# Patient Record
Sex: MALE | Race: WHITE | NOT HISPANIC OR LATINO | URBAN - METROPOLITAN AREA
[De-identification: names, ages, dates, MRNs, and addresses within clinical notes are randomized per-mention and may not be internally consistent; named-entity substitution may affect disease eponyms.]

---

## 2024-03-20 NOTE — PROGRESS NOTES
"Subjective   Aric Posey \"cleopatra\" is a 53 y.o. male who presents  as a NPV to ESTABLISH PCP CARE for CARE GAP REVIEW with COMPLAINTS OF NECK and SHOULDER PAIN.     HPI:      54 yo male EX-SMOKER (1 ppd x 10 years; quit 20 years ago) presents  as a NPV to ESTABLISH PCP CARE for CARE GAP REVIEW with COMPLAINTS OF NECK and SHOULDER PAIN.      EMR/EPIC records reviewed. LIMITED RECORDS AVAILABLE FOR REVIEW.    PMHx:  -anxiety; on celexa and buspar  -GERD  -Bilateral carpal tunnel and paraesthesias  -Left ulnar neuropathy: underwent EMG 10/16/2020 with Rosa Morales DO    Healthcare Providers:  GI: none  Neurology: Rosa Morales DO; last seen for EMG 10/26/2020    Preventive Health Services:  -Last physical:  -last PSA: ?  -last colonoscopy or cologuard: NEVER==> DUE NOW  -last STI screening: ?  -Hep C screening: ?      Immunizations:   -Childhood vaccines: completed per patient    -COVID vaccine and booster:  -updated COVID spike vaccine: NOW DUE  -Flu vaccine: NOW DUE==> DECLINED  -TDAP:  NOW DUE  -shingles: NOW DUE    There is no immunization history on file for this patient.      Today Aric  reports:    - ongoing neck and shoulder pain x 1.5 years, rated in severity as 8/10, not worsening over time.  Pain exacerbated by activity and lifting arms over shoulders , somewhat relieved with rest and tylenol. He denies fevers/chills, N/V, CP, heart palpitations, headache,  dizziness, weakness, numbness/tingling, saddle anesthesia, or difficulty walking, or  history of any known neck or shoulder trauma.  Expressed interest in referral to orthopedic surgery and xrays.     -ongoing bilateral knee pain x 2 years, rated rated in severity 4/10 , worsening over time.   He denies fevers/chills, weakness, knee swelling, numbness/tingling, difficulty walking, or  history of any known knee trauma, or family history of rheumatologic disease.  Expressed interest in referral to orthopedic surgery and xrays.     -otherwise doing " and feeling well.     -taking all medications as prescribed with no reported adverse medication side effects      Today he has no other reported complaints, issues, or problems.    ROS is NEG for HEADACHE, NAUSEA, VOMITING, DIARRHEA, CHEST PAIN, SOB, and BLEEDING.    Review of systems (10+) is negative for all systems except for any identified issues in HPI above.      Sexually active with  Denies history STIs      SHx:  -employment: work at factory, up and down ladders, building furnaces   -tobacco use: EX-SMOKER (1 ppd x 10 years; quit 20 years ago)   -alcohol use:  -illicits:       FHx:  Cancer:  HTN:  DM:  Heart Disease:  -a fib: father  Stroke:  Thyroid Disease:        Objective     /78   Pulse 91   Temp 36.1 °C (97 °F)   Wt 113 kg (250 lb)   SpO2 98%   BMI 32.10 kg/m²      Physical Examination:       GENERAL           General Appearance: well-appearing, well-developed, well-hydrated, well-nourished, no acute distress.        HEENT           NECK supple, no masses or thyromegaly, no carotid bruit.        EYES           Extraocular Movements: normal, bilateral eyes ANGELINA, no conjunctival injection.        HEART           Rate and Rhythm regular rate and rhythm. Heart sounds: normal S1S2, no S3 or S4. Murmurs: none.        CHEST           Breath sounds: Clear to IPPA, RR<16 no use of accessory muscles.        ABDOMEN           General: Neg for LKKS or masses, no scleral icterus or jaundice.        MUSCULOSKELETAL           Joints Demonstration: Neg for erythema, swelling or joint deformities. gross abnormalities no gross abnormalities. SPINE (cervical==> coccyx): non-tender to palpation. FULL ROM.  Cervical Paraspinal muscles: tender to palpation. RIGHT and LEFT Shoulders bilaterally: AC joint mildly tender to palpation, no effusions, no erythema, no winging of scapula, negative Neer and Hare test. mildly decreased ROM, when abducting arm posteriorly. Right and left hands/UR neurovascularly intact.  RIGHT and LEFT KNEES: non-tender to palpation. No effusions, no joint line tenderness, no erythema, no callor. Full ROM. Negative anterior drawer and Lachman's.       EXTREMITIES           Lower Extremities: Neg for cyanosis, clubbing or edema.       Assessment/Plan   Problem List Items Addressed This Visit       Arthralgia    Relevant Orders    Uric acid    Sedimentation Rate    C-reactive protein    Rheumatoid factor    MIRTA     Other Visit Diagnoses       Encounter to establish care    -  Primary    Relevant Orders    CBC and Auto Differential    Comprehensive metabolic panel    Urinalysis with Reflex Microscopic    Tsh With Reflex To Free T4 If Abnormal    Lipid screening        Relevant Orders    Lipid panel    Diabetes mellitus screening        Relevant Orders    Hemoglobin A1c    Vitamin D deficiency        Relevant Orders    Vitamin D 25-Hydroxy,Total (for eval of Vitamin D levels)    Encounter for hepatitis C screening test for low risk patient        Relevant Orders    Hepatitis C antibody    Routine screening for STI (sexually transmitted infection)        Relevant Orders    HIV 1/2 Antigen/Antibody Screen with Reflex to Confirmation    Syphilis Screen with Reflex    C. trachomatis / N. gonorrhoeae, DNA probe    Trichomonas vaginalis, Amplified    Colon cancer screening        Relevant Orders    Colonoscopy Screening; Average Risk Patient    Cologuard® colon cancer screening    Prostate cancer screening        Relevant Orders    Prostate Spec.Ag,Screen    Neck pain        Relevant Orders    XR cervical spine complete 4-5 views    Referral to Physical Therapy    XR cervical spine complete 4-5 views    Shoulder pain, unspecified chronicity, unspecified laterality        Relevant Orders    Referral to Physical Therapy    Encounter for screening for coronary artery disease        Relevant Orders    CT cardiac scoring wo IV contrast    Immunization counseling        Chronic left shoulder pain        Relevant  Orders    XR shoulder left 2+ views    Chronic right shoulder pain        Relevant Orders    XR shoulder right 2+ views    Chronic pain of left knee        Relevant Orders    XR knee left 4+ views    Chronic pain of right knee        Relevant Orders    XR knee right 4+ views          Establish PCP care  -labs ordered (see A/P above for details)    Cervical neck pain: on exam+ TPP of paraspinal cervical muscles most consistent with cervical muscle strain and muscle spasms. No red flag signs/sxs.  -neck XR ordered  -PT referral ordered  -heating pad applied to neck 4 times a day  -ibuprofen 600 mg every 8 hours  with food x 5 days  -emergency Dept precautions discussed and reviewed with patient    Bilateral Shoulder  and knee pain: r/o OA, soft tissue structural issues, and rheumatologic disorder  -uric acid, RF, ESR, CRP, MIRTA ordered  -XR bilateral knees and shoulders ordered  -PT referral ordered  -orthopedic surgery referral ordered  -ibuprofen 600 mg every 8 hours with food  x 5 days      Elevated BP:  -low salt diet, regularly exercise, and limit alcohol intake  -if remains elevated at follow up visit will discuss starting BP medications      Lipid Disorder screening  -lipid panel ordered    Diabetes Screening  -HgBA1c ordered    Vitamin D deficiency  -Vit D levels ordered     Hep C screening  -Hep C antibody ordered     STI Screening:  -HIV, syphilis, GC/CT, trich ordered    Colon Cancer Screening: NOW DUE   -colonoscopy ordered  -cologuard ordered if patient decides he prefers cologuard testing over colonoscopy    Counseling:       Medication education:         Education:  The patient is counseled regarding potential side-effects of all new medications        Understanding:  Patient expressed understanding        Adherence:  No barriers to adherence identified        Immunizations Counseling  -flu vaccine, shingles, and TDAP now due==> declined today  -recommend updated COVID spike vaccine that can be obtained  at local pharmacy     FOLLOW-UP: 4 weeks to discuss and review test results and 8 weeks for PHYSICAL     Discussed recommended plan of care with patient. Patient expressed understanding and agreement with plan of care. All of patient's questions were answered at the time. Patient had no additional questions at the time.         Shea Yeung MD, PhD

## 2024-03-20 NOTE — PATIENT INSTRUCTIONS
It was nice seeing you today.    Please go to [facility] lab or another [facility] lab facility to complete the lab testing that I ordered . You can walk into the [facility] radiology to dept to complete neck, knee, and shoulder Xrays     Please call radiology to schedule   CT Heart Calcium scoring    Please call referral line to schedule: 1) colonoscopy; and 2) physical therapy    If  you prefer cologuard for colon cancer screening instead of a colonoscopy, I have ordered cologuard tat will be shipped your home. When you receive please follow the directions and send back to the company for processing.     I recommend receiving the TDAP booster that prevents tetanus and other infectious disease, shingles vaccine, and  the flu vaccine    I recommend the updated COVID vaccine that can be obtained at your local pharmacy    Please schedule a follow up with me in 4  weeks to discuss and review your test results and 8 weeks for a physical

## 2024-03-21 ENCOUNTER — HOSPITAL ENCOUNTER (OUTPATIENT)
Dept: RADIOLOGY | Facility: CLINIC | Age: 54
Discharge: HOME | End: 2024-03-21
Payer: COMMERCIAL

## 2024-03-21 ENCOUNTER — OFFICE VISIT (OUTPATIENT)
Dept: PRIMARY CARE | Facility: CLINIC | Age: 54
End: 2024-03-21
Payer: COMMERCIAL

## 2024-03-21 VITALS
HEART RATE: 91 BPM | TEMPERATURE: 97 F | DIASTOLIC BLOOD PRESSURE: 78 MMHG | WEIGHT: 250 LBS | OXYGEN SATURATION: 98 % | BODY MASS INDEX: 32.1 KG/M2 | SYSTOLIC BLOOD PRESSURE: 138 MMHG

## 2024-03-21 DIAGNOSIS — M54.2 NECK PAIN: ICD-10-CM

## 2024-03-21 DIAGNOSIS — Z13.1 DIABETES MELLITUS SCREENING: ICD-10-CM

## 2024-03-21 DIAGNOSIS — G89.29 CHRONIC RIGHT SHOULDER PAIN: ICD-10-CM

## 2024-03-21 DIAGNOSIS — M25.519 SHOULDER PAIN, UNSPECIFIED CHRONICITY, UNSPECIFIED LATERALITY: ICD-10-CM

## 2024-03-21 DIAGNOSIS — Z71.85 IMMUNIZATION COUNSELING: ICD-10-CM

## 2024-03-21 DIAGNOSIS — M25.511 CHRONIC RIGHT SHOULDER PAIN: ICD-10-CM

## 2024-03-21 DIAGNOSIS — G89.29 CHRONIC PAIN OF RIGHT KNEE: ICD-10-CM

## 2024-03-21 DIAGNOSIS — Z13.220 LIPID SCREENING: ICD-10-CM

## 2024-03-21 DIAGNOSIS — Z13.6 ENCOUNTER FOR SCREENING FOR CORONARY ARTERY DISEASE: ICD-10-CM

## 2024-03-21 DIAGNOSIS — M25.512 CHRONIC LEFT SHOULDER PAIN: ICD-10-CM

## 2024-03-21 DIAGNOSIS — M25.561 CHRONIC PAIN OF RIGHT KNEE: ICD-10-CM

## 2024-03-21 DIAGNOSIS — E55.9 VITAMIN D DEFICIENCY: ICD-10-CM

## 2024-03-21 DIAGNOSIS — G89.29 CHRONIC PAIN OF LEFT KNEE: ICD-10-CM

## 2024-03-21 DIAGNOSIS — M25.50 ARTHRALGIA, UNSPECIFIED JOINT: ICD-10-CM

## 2024-03-21 DIAGNOSIS — Z12.5 PROSTATE CANCER SCREENING: ICD-10-CM

## 2024-03-21 DIAGNOSIS — Z76.89 ENCOUNTER TO ESTABLISH CARE: Primary | ICD-10-CM

## 2024-03-21 DIAGNOSIS — M25.562 CHRONIC PAIN OF LEFT KNEE: ICD-10-CM

## 2024-03-21 DIAGNOSIS — Z12.11 COLON CANCER SCREENING: ICD-10-CM

## 2024-03-21 DIAGNOSIS — G89.29 CHRONIC LEFT SHOULDER PAIN: ICD-10-CM

## 2024-03-21 DIAGNOSIS — Z11.3 ROUTINE SCREENING FOR STI (SEXUALLY TRANSMITTED INFECTION): ICD-10-CM

## 2024-03-21 DIAGNOSIS — Z11.59 ENCOUNTER FOR HEPATITIS C SCREENING TEST FOR LOW RISK PATIENT: ICD-10-CM

## 2024-03-21 DIAGNOSIS — R03.0 ELEVATED BLOOD PRESSURE READING: ICD-10-CM

## 2024-03-21 PROBLEM — K21.9 LARYNGOPHARYNGEAL REFLUX: Status: ACTIVE | Noted: 2024-03-21

## 2024-03-21 PROBLEM — R09.A2 GLOBUS SENSATION: Status: ACTIVE | Noted: 2024-03-21

## 2024-03-21 PROBLEM — F41.9 CHRONIC ANXIETY: Status: ACTIVE | Noted: 2018-10-31

## 2024-03-21 PROCEDURE — 1036F TOBACCO NON-USER: CPT | Performed by: FAMILY MEDICINE

## 2024-03-21 PROCEDURE — 72050 X-RAY EXAM NECK SPINE 4/5VWS: CPT | Performed by: RADIOLOGY

## 2024-03-21 PROCEDURE — 73564 X-RAY EXAM KNEE 4 OR MORE: CPT | Mod: RT

## 2024-03-21 PROCEDURE — 73564 X-RAY EXAM KNEE 4 OR MORE: CPT | Mod: LEFT SIDE | Performed by: RADIOLOGY

## 2024-03-21 PROCEDURE — 73030 X-RAY EXAM OF SHOULDER: CPT | Mod: LT

## 2024-03-21 PROCEDURE — 73564 X-RAY EXAM KNEE 4 OR MORE: CPT | Mod: LT

## 2024-03-21 PROCEDURE — 73030 X-RAY EXAM OF SHOULDER: CPT | Mod: RT

## 2024-03-21 PROCEDURE — 73030 X-RAY EXAM OF SHOULDER: CPT | Mod: RIGHT SIDE | Performed by: RADIOLOGY

## 2024-03-21 PROCEDURE — 72050 X-RAY EXAM NECK SPINE 4/5VWS: CPT

## 2024-03-21 PROCEDURE — 99204 OFFICE O/P NEW MOD 45 MIN: CPT | Performed by: FAMILY MEDICINE

## 2024-03-21 RX ORDER — TRAZODONE HYDROCHLORIDE 100 MG/1
100 TABLET ORAL NIGHTLY
COMMUNITY

## 2024-03-21 RX ORDER — BUSPIRONE HYDROCHLORIDE 10 MG/1
10 TABLET ORAL 2 TIMES DAILY
COMMUNITY

## 2024-03-21 RX ORDER — CITALOPRAM 40 MG/1
40 TABLET, FILM COATED ORAL DAILY
COMMUNITY

## 2024-03-21 RX ORDER — PANTOPRAZOLE SODIUM 40 MG/1
40 TABLET, DELAYED RELEASE ORAL DAILY
COMMUNITY

## 2024-03-21 ASSESSMENT — PATIENT HEALTH QUESTIONNAIRE - PHQ9
2. FEELING DOWN, DEPRESSED OR HOPELESS: NOT AT ALL
SUM OF ALL RESPONSES TO PHQ9 QUESTIONS 1 AND 2: 0
1. LITTLE INTEREST OR PLEASURE IN DOING THINGS: NOT AT ALL

## 2024-03-21 ASSESSMENT — PAIN SCALES - GENERAL: PAINLEVEL: 5

## 2024-03-22 ENCOUNTER — LAB (OUTPATIENT)
Dept: LAB | Facility: LAB | Age: 54
End: 2024-03-22
Payer: COMMERCIAL

## 2024-03-22 DIAGNOSIS — Z76.89 ENCOUNTER TO ESTABLISH CARE: ICD-10-CM

## 2024-03-22 DIAGNOSIS — Z11.59 ENCOUNTER FOR HEPATITIS C SCREENING TEST FOR LOW RISK PATIENT: ICD-10-CM

## 2024-03-22 DIAGNOSIS — Z11.3 ROUTINE SCREENING FOR STI (SEXUALLY TRANSMITTED INFECTION): ICD-10-CM

## 2024-03-22 DIAGNOSIS — M25.50 ARTHRALGIA, UNSPECIFIED JOINT: ICD-10-CM

## 2024-03-22 DIAGNOSIS — Z13.1 DIABETES MELLITUS SCREENING: ICD-10-CM

## 2024-03-22 DIAGNOSIS — Z12.5 PROSTATE CANCER SCREENING: ICD-10-CM

## 2024-03-22 DIAGNOSIS — E55.9 VITAMIN D DEFICIENCY: ICD-10-CM

## 2024-03-22 DIAGNOSIS — Z13.220 LIPID SCREENING: ICD-10-CM

## 2024-03-22 LAB
25(OH)D3 SERPL-MCNC: 28 NG/ML (ref 31–100)
ALBUMIN SERPL-MCNC: 4.8 G/DL (ref 3.5–5)
ALP BLD-CCNC: 89 U/L (ref 35–125)
ALT SERPL-CCNC: 35 U/L (ref 5–40)
ANION GAP SERPL CALC-SCNC: 10 MMOL/L
AST SERPL-CCNC: 27 U/L (ref 5–40)
BASOPHILS # BLD AUTO: 0.02 X10*3/UL (ref 0–0.1)
BASOPHILS NFR BLD AUTO: 0.4 %
BILIRUB SERPL-MCNC: 0.5 MG/DL (ref 0.1–1.2)
BUN SERPL-MCNC: 17 MG/DL (ref 8–25)
CALCIUM SERPL-MCNC: 9.8 MG/DL (ref 8.5–10.4)
CHLORIDE SERPL-SCNC: 100 MMOL/L (ref 97–107)
CHOLEST SERPL-MCNC: 239 MG/DL (ref 133–200)
CHOLEST/HDLC SERPL: 6.3 {RATIO}
CO2 SERPL-SCNC: 27 MMOL/L (ref 24–31)
CREAT SERPL-MCNC: 1.1 MG/DL (ref 0.4–1.6)
CRP SERPL-MCNC: <0.3 MG/DL (ref 0–2)
EGFRCR SERPLBLD CKD-EPI 2021: 79 ML/MIN/1.73M*2
EOSINOPHIL # BLD AUTO: 0.08 X10*3/UL (ref 0–0.7)
EOSINOPHIL NFR BLD AUTO: 1.6 %
ERYTHROCYTE [DISTWIDTH] IN BLOOD BY AUTOMATED COUNT: 12.1 % (ref 11.5–14.5)
ERYTHROCYTE [SEDIMENTATION RATE] IN BLOOD BY WESTERGREN METHOD: 7 MM/H (ref 0–20)
EST. AVERAGE GLUCOSE BLD GHB EST-MCNC: 117 MG/DL
GLUCOSE SERPL-MCNC: 102 MG/DL (ref 65–99)
HBA1C MFR BLD: 5.7 %
HCT VFR BLD AUTO: 43.6 % (ref 41–52)
HCV AB SER QL: NONREACTIVE
HDLC SERPL-MCNC: 38 MG/DL
HGB BLD-MCNC: 14.9 G/DL (ref 13.5–17.5)
HIV 1+2 AB+HIV1 P24 AG SERPL QL IA: NONREACTIVE
IMM GRANULOCYTES # BLD AUTO: 0.01 X10*3/UL (ref 0–0.7)
IMM GRANULOCYTES NFR BLD AUTO: 0.2 % (ref 0–0.9)
LDLC SERPL CALC-MCNC: 157 MG/DL (ref 65–130)
LYMPHOCYTES # BLD AUTO: 1.47 X10*3/UL (ref 1.2–4.8)
LYMPHOCYTES NFR BLD AUTO: 29.5 %
MCH RBC QN AUTO: 31.8 PG (ref 26–34)
MCHC RBC AUTO-ENTMCNC: 34.2 G/DL (ref 32–36)
MCV RBC AUTO: 93 FL (ref 80–100)
MONOCYTES # BLD AUTO: 0.47 X10*3/UL (ref 0.1–1)
MONOCYTES NFR BLD AUTO: 9.4 %
NEUTROPHILS # BLD AUTO: 2.93 X10*3/UL (ref 1.2–7.7)
NEUTROPHILS NFR BLD AUTO: 58.9 %
NRBC BLD-RTO: 0 /100 WBCS (ref 0–0)
PLATELET # BLD AUTO: 237 X10*3/UL (ref 150–450)
POTASSIUM SERPL-SCNC: 4.6 MMOL/L (ref 3.4–5.1)
PROT SERPL-MCNC: 7.5 G/DL (ref 5.9–7.9)
PSA SERPL-MCNC: 0.5 NG/ML
RBC # BLD AUTO: 4.69 X10*6/UL (ref 4.5–5.9)
RHEUMATOID FACT SER NEPH-ACNC: <10 IU/ML (ref 0–15)
SODIUM SERPL-SCNC: 137 MMOL/L (ref 133–145)
TREPONEMA PALLIDUM IGG+IGM AB [PRESENCE] IN SERUM OR PLASMA BY IMMUNOASSAY: NONREACTIVE
TRIGL SERPL-MCNC: 219 MG/DL (ref 40–150)
TSH SERPL DL<=0.05 MIU/L-ACNC: 1.24 MIU/L (ref 0.27–4.2)
URATE SERPL-MCNC: 6.9 MG/DL (ref 3.6–7.7)
WBC # BLD AUTO: 5 X10*3/UL (ref 4.4–11.3)

## 2024-03-22 PROCEDURE — 80053 COMPREHEN METABOLIC PANEL: CPT

## 2024-03-22 PROCEDURE — 84550 ASSAY OF BLOOD/URIC ACID: CPT

## 2024-03-22 PROCEDURE — 86038 ANTINUCLEAR ANTIBODIES: CPT

## 2024-03-22 PROCEDURE — 84443 ASSAY THYROID STIM HORMONE: CPT

## 2024-03-22 PROCEDURE — 36415 COLL VENOUS BLD VENIPUNCTURE: CPT

## 2024-03-22 PROCEDURE — 87389 HIV-1 AG W/HIV-1&-2 AB AG IA: CPT

## 2024-03-22 PROCEDURE — 86803 HEPATITIS C AB TEST: CPT

## 2024-03-22 PROCEDURE — 84153 ASSAY OF PSA TOTAL: CPT

## 2024-03-22 PROCEDURE — 82306 VITAMIN D 25 HYDROXY: CPT

## 2024-03-22 PROCEDURE — 86431 RHEUMATOID FACTOR QUANT: CPT

## 2024-03-22 PROCEDURE — 80061 LIPID PANEL: CPT

## 2024-03-22 PROCEDURE — 86780 TREPONEMA PALLIDUM: CPT

## 2024-03-22 PROCEDURE — 83036 HEMOGLOBIN GLYCOSYLATED A1C: CPT

## 2024-03-22 PROCEDURE — 86140 C-REACTIVE PROTEIN: CPT

## 2024-03-22 PROCEDURE — 85652 RBC SED RATE AUTOMATED: CPT

## 2024-03-22 PROCEDURE — 85025 COMPLETE CBC W/AUTO DIFF WBC: CPT

## 2024-03-25 LAB — ANA SER QL HEP2 SUBST: NEGATIVE

## 2024-04-19 ENCOUNTER — OFFICE VISIT (OUTPATIENT)
Dept: ORTHOPEDIC SURGERY | Facility: CLINIC | Age: 54
End: 2024-04-19
Payer: COMMERCIAL

## 2024-04-19 VITALS — HEIGHT: 74 IN | WEIGHT: 250 LBS | BODY MASS INDEX: 32.08 KG/M2

## 2024-04-19 DIAGNOSIS — M54.12 CERVICAL RADICULOPATHY: ICD-10-CM

## 2024-04-19 DIAGNOSIS — M54.2 NECK PAIN: ICD-10-CM

## 2024-04-19 PROCEDURE — 99203 OFFICE O/P NEW LOW 30 MIN: CPT | Performed by: ORTHOPAEDIC SURGERY

## 2024-04-19 ASSESSMENT — PAIN - FUNCTIONAL ASSESSMENT: PAIN_FUNCTIONAL_ASSESSMENT: 0-10

## 2024-04-19 NOTE — LETTER
April 19, 2024     Shea Yeung MD PhD  [address] [facility] [id number]    Patient: Lyle Posey   YOB: 1970   Date of Visit: 4/19/2024       Dear Dr. Shea Yeung MD PhD:    Thank you for referring Lyle Posey to me for evaluation. Below are my notes for this consultation.  If you have questions, please do not hesitate to call me. I look forward to following your patient along with you.       Sincerely,     Ever Grant MD      CC: No Recipients  ______________________________________________________________________________________    HPI:Aric Posey is a 53-year-old man who comes in today with complaints of neck pain and intermittent tingling and numbness to his upper extremities.  He notices the tingling in the arms particular at nighttime when he is lying down.  He has had no recent physical therapy.  He denies any specific myelopathic symptoms other than some occasional problems with balance.      ROS:  Reviewed on EMR and patient intake sheet.    PMH/SH:   Reviewed on EMR and patient intake sheet.    Exam:  Physical Exam    Constitutional: Well appearing; no acute distress  Eyes: pupils are equal and round  Psych: normal affect  Respiratory: non-labored breathing  Cardiovascular: regular rate and rhythm  GI: non-distended abdomen  Musculoskeletal: no pain with range of motion of the shoulders bilaterally; no signs of impingement  Neurologic: [4+]/5 strength in the upper extremities bilaterally]; [negative] Robles's; [no hyper-reflexia]    Radiology:  X-rays demonstrate moderate disc degeneration at C5-6 and C6-C7.  MRI 4 years ago did demonstrate some moderate canal narrowing and neuroforaminal narrowing at the same levels.    Diagnosis:  Cervical radiculopathy    Assessment and Plan:   53-year-old male with symptomatic cervical radiculopathy.  At this time we will begin with physical therapy.  However if his symptoms do not improve, then an MRI and follow-up would be  appropriate.    The patient was in agreement with the plan. At the end of the visit today, the patient felt that all questions had been answered satisfactorily.  The patient was pleased with the visit and very appreciative for the care rendered.     Thank you very much for the kind referral.  It is a privilege, and a pleasure, to partner with you in the care of your patients.  I would be delighted to assist you with any further consultations as needed.      Ever Grant MD    Chief of Spine Surgery, [facility]  Director of Spine Service, [facility]  , Department of Orthopaedics  [facility] [employer]  [address] [id number]  P: [phone]  [facility]    This note was dictated with voice recognition software.  It has not been proofread for grammatical errors, typographical mistakes or other semantic inconsistencies.

## 2024-04-19 NOTE — PROGRESS NOTES
HPI:Aric Posey is a 53-year-old man who comes in today with complaints of neck pain and intermittent tingling and numbness to his upper extremities.  He notices the tingling in the arms particular at nighttime when he is lying down.  He has had no recent physical therapy.  He denies any specific myelopathic symptoms other than some occasional problems with balance.      ROS:  Reviewed on EMR and patient intake sheet.    PMH/SH:   Reviewed on EMR and patient intake sheet.    Exam:  Physical Exam    Constitutional: Well appearing; no acute distress  Eyes: pupils are equal and round  Psych: normal affect  Respiratory: non-labored breathing  Cardiovascular: regular rate and rhythm  GI: non-distended abdomen  Musculoskeletal: no pain with range of motion of the shoulders bilaterally; no signs of impingement  Neurologic: [4+]/5 strength in the upper extremities bilaterally]; [negative] Robles's; [no hyper-reflexia]    Radiology:  X-rays demonstrate moderate disc degeneration at C5-6 and C6-C7.  MRI 4 years ago did demonstrate some moderate canal narrowing and neuroforaminal narrowing at the same levels.    Diagnosis:  Cervical radiculopathy    Assessment and Plan:   53-year-old male with symptomatic cervical radiculopathy.  At this time we will begin with physical therapy.  However if his symptoms do not improve, then an MRI and follow-up would be appropriate.    The patient was in agreement with the plan. At the end of the visit today, the patient felt that all questions had been answered satisfactorily.  The patient was pleased with the visit and very appreciative for the care rendered.     Thank you very much for the kind referral.  It is a privilege, and a pleasure, to partner with you in the care of your patients.  I would be delighted to assist you with any further consultations as needed.      Ever Grant MD    Chief of Spine Surgery, [facility]  Director of Spine  Service, [facility]  , Department of Orthopaedics  [facility] [employer]  [address] [place] [address]  P: [phone]  [facility]Ohio[facility] [email or web address] [facility]    This note was dictated with voice recognition software.  It has not been proofread for grammatical errors, typographical mistakes or other semantic inconsistencies.

## 2024-04-27 NOTE — PROGRESS NOTES
"Momo Posey \"Lyle\" is a 53 y.o. male who presents for FOLLOW UP VISIT TO DISCUSS and REVIEW TEST RESULTS, and FOLLOW UP FOR COMPLAINTS OF NECK and SHOULDER PAIN.     HPI:      52 yo male EX-SMOKER (1 ppd x 10 years; quit 20 years ago) presents FOLLOW UP VISIT TO DISCUSS and REVIEW TEST RESULTS, and FOLLOW UP FOR COMPLAINTS OF NECK and SHOULDER PAIN.     EMR/EPIC records reviewed.    Last seen by me on 3/21/24 as a NPV to ESTABLISH PCP CARE for CARE GAP REVIEW with COMPLAINTS OF NECK and SHOULDER PAIN. At visit:    Establish PCP care  -labs ordered      Cervical neck pain: on exam+ TPP of paraspinal cervical muscles most consistent with cervical muscle strain and muscle spasms. No red flag signs/sxs.  -neck XR ordered  -PT referral ordered  -heating pad applied to neck 4 times a day  -ibuprofen 600 mg every 8 hours  with food x 5 days  -emergency Dept precautions discussed and reviewed with patient     Bilateral Shoulder  and knee pain: r/o OA, soft tissue structural issues, and rheumatologic disorder  -uric acid, RF, ESR, CRP, MIRTA ordered  -XR bilateral knees and shoulders ordered  -PT referral ordered  -orthopedic surgery referral ordered  -ibuprofen 600 mg every 8 hours with food  x 5 days       Elevated BP:  -low salt diet, regularly exercise, and limit alcohol intake  -if remains elevated at follow up visit will discuss starting BP medications       Lipid Disorder screening  -lipid panel ordered     Diabetes Screening  -HgBA1c ordered     Vitamin D deficiency  -Vit D levels ordered     Hep C screening  -Hep C antibody ordered     STI Screening:  -HIV, syphilis, GC/CT, trich ordered     Colon Cancer Screening: NOW DUE   -colonoscopy ordered  -cologuard ordered if patient decides he prefers cologuard testing over colonoscopy         Immunizations Counseling  -flu vaccine, shingles, and TDAP now due==> declined today  -recommend updated COVID spike vaccine that can be obtained at local " pharmacy     FOLLOW-UP: 4 weeks to discuss and review test results and 8 weeks for PHYSICAL      After visit on 3/21/24 based on test results:    -Vit D level sub-optimal: patient advised to please start over the counter Vit D3 2,000 units daily     -prediabetes (HgBA1c 5.7): patient advised to please follow a low carbohydrate, low fat, low cholesterol diet, regularly exercise, and limit alcohol intake. Will plan to repeat HgBA1c in 3 months; NEXT DUE 6/2024.     -elevated total cholesterol (239), elevated LDL cholesterol (157), and elevated triglycerides (219): patient advised to please follow a low carbohydrate, low fat, low cholesterol diet, regularly exercise, and limit alcohol intake.     -low HDL (good) cholesterol levels: patient advised to please start over the counter omega-3 fish oil 1,000 mg daily     -mildly decreased renal function (GFR 79).  Will continue to monitor     -mildly elevated glucose     -normal liver function and normal electrolytes     -thyroid function normal     -uric acid, CRP, and ESR normal    -MIRTA negative     -negative HIV, syphilis, and Hep C     -rheumatoid factor normal     -normal blood counts         XR bilateral knees (3/21/24):  FINDINGS:  On the left, there is a small suprapatellar effusion. The joint  compartments are maintained. There is no subluxation with  weight-bearing. No bony abnormality is seen.      On the right, joint compartments are preserved. There is no joint  effusion. No bony abnormality on the right is demonstrated.      IMPRESSION:  Normal appearance of right knee.    XR cervical spine 3/21/24:  Degenerative disc space narrowing at C5-6 and C6-7 with spondylotic  spurring at these levels. Moderate bony foraminal impingement on the  left at C6-7 due to uncovertebral joint spurring with mild bilateral  bony foraminal impingement at C5-6.     ==> patient advised I have ordered a referral to orthopedic surgery spine    XR bilateral shoulders  "3/21/24:  FINDINGS:  Bony mineralization is normal. There is no fracture, dislocation, or  bony abnormality on either side. The joint compartments are  preserved. No abnormal soft tissue calcifications are seen.      IMPRESSION:  Normal appearance of both shoulders.              PMHx:  -anxiety; on celexa and buspar  -GERD  -Bilateral carpal tunnel and paraesthesias  -Left ulnar neuropathy: underwent EMG 10/16/2020 with Rosa Morales DO  -cervical radiculopathy==> referred to PT ordered  -bilateral shoulder pain==> referred to ortho and PT ordered  -bilateral knee pain==>referred to ortho and PT ordered     Healthcare Providers:  GI: none  Orthopedic spine: Dr. Grant; last seen 4/19/24  Neurology: Rosa Morales DO; last seen for EMG 10/26/2020     Preventive Health Services:  -Last physical:  -last PSA: 0.50 3/22/24  -last colonoscopy or cologuard: NEVER==> DUE NOW==> COLOGUARD ORDERED 3/21/24  -last STI screening: HIV, syphilis negative 3/22/24==> ORDERED GC/CT/trich 3/21/24==> NOT YET COMPLETED  -Hep C screening:  Hep C negative 3/22/24        Immunizations:   -Childhood vaccines: completed per patient    -COVID vaccine and booster:  -updated COVID spike vaccine: NOW DUE  -Flu vaccine: NOW DUE==> DECLINED  -TDAP:  NOW DUE  -shingles: NOW DUE       There is no immunization history on file for this patient.         INTERVAL HISTORY:    -Completed labs ordered 3/21/24     -Completed Xrays    -saw orthopedic surgery spine Dr. Grant 4/19/24. Per provider note:  \"53-year-old male with symptomatic cervical radiculopathy. At this time we will begin with physical therapy. However if his symptoms do not improve, then an MRI and follow-up would be appropriate. \"          -Vit D level sub-optimal: patient advised to please start over the counter Vit D3 2,000 units daily     -prediabetes (HgBA1c 5.7): patient advised to please follow a low carbohydrate, low fat, low cholesterol diet, regularly exercise, and limit alcohol " intake. Will plan to repeat HgBA1c in 3 months; NEXT DUE 6/2024.     -elevated total cholesterol (239), elevated LDL cholesterol (157), and elevated triglycerides (219): patient advised to please follow a low carbohydrate, low fat, low cholesterol diet, regularly exercise, and limit alcohol intake.     -low HDL (good) cholesterol levels: patient advised to please start over the counter omega-3 fish oil 1,000 mg daily     -mildly decreased renal function (GFR 79).  Will continue to monitor     -mildly elevated glucose     -normal liver function and normal electrolytes     -thyroid function normal     -uric acid, CRP, and ESR normal    -MIRTA negative     -negative HIV, syphilis, and Hep C     -rheumatoid factor normal     -normal blood counts         XR bilateral knees (3/21/24):  FINDINGS:  On the left, there is a small suprapatellar effusion. The joint  compartments are maintained. There is no subluxation with  weight-bearing. No bony abnormality is seen.      On the right, joint compartments are preserved. There is no joint  effusion. No bony abnormality on the right is demonstrated.      IMPRESSION:  Normal appearance of right knee.    XR cervical spine 3/21/24:  Degenerative disc space narrowing at C5-6 and C6-7 with spondylotic  spurring at these levels. Moderate bony foraminal impingement on the  left at C6-7 due to uncovertebral joint spurring with mild bilateral  bony foraminal impingement at C5-6.     ==> patient advised I have ordered a referral to orthopedic surgery spine    XR bilateral shoulders 3/21/24:  FINDINGS:  Bony mineralization is normal. There is no fracture, dislocation, or  bony abnormality on either side. The joint compartments are  preserved. No abnormal soft tissue calcifications are seen.      IMPRESSION:  Normal appearance of both shoulders.           Today Aric  reports:     - ongoing neck and shoulder pain x 1.5 years, rated in severity as 8/10, not worsening over time.  Pain  exacerbated by activity and lifting arms over shoulders , somewhat relieved with rest and tylenol. He denies fevers/chills, N/V, CP, heart palpitations, headache,  dizziness, weakness, numbness/tingling, saddle anesthesia, or difficulty walking, or  history of any known neck or shoulder trauma.  Expressed interest in referral to orthopedic surgery and xrays.     -ongoing lower back  pain x 3 weeks rated in severity as 4-5/10, not worsening over time. Lower back pain  exacerbated by bending over, somewhat relieved by laying flat in bed.  He denies fever/chills, back pain with neck flexion, saddle anesthesia, difficulty voiding defecating, weakness, or numbness or tingling of LE, or difficulty walking. He reports that he known DJD of lower back. He expressed interest in referral to ortho and to PT.      -ongoing bilateral knee pain x 2 years, rated rated in severity 4/10 , worsening over time.   He denies fevers/chills, weakness, knee swelling, numbness/tingling, difficulty walking, or  history of any known knee trauma, or family history of rheumatologic disease.  Expressed interest in referral to orthopedic surgery and xrays.      -otherwise doing and feeling well.      -taking all medications as prescribed with no reported adverse medication side effects        Today he has no other reported complaints, issues, or problems.     ROS is NEG for HEADACHE, NAUSEA, VOMITING, DIARRHEA, CHEST PAIN, SOB, and BLEEDING.     Review of systems (10+) is negative for all systems except for any identified issues in HPI above.         SHx:  -employment: work at factory, up and down ladders, building furnaces   -tobacco use: EX-SMOKER (1 ppd x 10 years; quit 20 years ago)   -alcohol use:  -illicits:         FHx:  Cancer:  HTN:  DM:  Heart Disease:  -a fib: father  Stroke:  Thyroid Disease:      Objective     /70   Pulse 82   Temp 36 °C (96.8 °F)   Resp 18   Wt 113 kg (248 lb 3.2 oz)   SpO2 96%   BMI 31.87 kg/m²       Physical Examination:       GENERAL           General Appearance: well-appearing, well-developed, well-hydrated, well-nourished, no acute distress.        HEENT           NECK supple, no masses or thyromegaly, no carotid bruit.        EYES           Extraocular Movements: normal, bilateral eyes ANGELINA, no conjunctival injection.        HEART           Rate and Rhythm regular rate and rhythm. Heart sounds: normal S1S2, no S3 or S4. Murmurs: none.        CHEST           Breath sounds: Clear to IPPA, RR<16 no use of accessory muscles.        ABDOMEN           General: Neg for LKKS or masses, no scleral icterus or jaundice.        MUSCULOSKELETAL           Joints Demonstration: Neg for erythema, swelling or joint deformities. gross abnormalities no gross abnormalities.  SPINE (cervical==> coccyx): non-tender to palpation. FULL ROM.  Cervical and lumbar Paraspinal muscles: tender to palpation. RIGHT and LEFT Shoulders bilaterally: AC joint mildly tender to palpation, no effusions, no erythema, no winging of scapula, negative Neer and Hare test. mildly decreased ROM, when abducting arm posteriorly. Right and left hands/UR neurovascularly intact. RIGHT and LEFT KNEES: non-tender to palpation. No effusions, no joint line tenderness, no erythema, no callor. Full ROM. Negative anterior drawer and Lachman's.        EXTREMITIES           Lower Extremities: Neg for cyanosis, clubbing or edema.       Assessment/Plan   Problem List Items Addressed This Visit       GERD (gastroesophageal reflux disease)     Other Visit Diagnoses       Prediabetes    -  Primary    Cervical radiculopathy        Mixed hyperlipidemia        Low HDL (under 40)        Chronic pain of left knee        Chronic pain of right knee        Colon cancer screening        Routine screening for STI (sexually transmitted infection)        Vitamin D deficiency        Immunization counseling        Anxiety        BMI 32.0-32.9,adult                History  Elevated BP: at last visit /78, today WNL  -low salt diet, regularly exercise, and limit alcohol intake  -if remains elevated at follow up visit will discuss starting BP medications    Prediabetes (HgBA1c 5.7):   - low carbohydrate, low fat, low cholesterol diet, regularly exercise, and limit alcohol intake.   -Will plan to repeat HgBA1c in 3 months; NEXT DUE 6/2024.     Hyperlipidemia: elevated total cholesterol (239), elevated LDL cholesterol (157), and elevated triglycerides (219):   -discussed with patient that I recommend starting statin medication; patient would like to think about it  - low carbohydrate, low fat, low cholesterol diet, regularly exercise, and limit alcohol intake.     Low HDL levels:  -start over the counter omega-3 fish oil 1,000 mg daily    Anxiety: well controlled  -cont celexa and buspar    BMI 32  -low carbohydrate, low fat, low cholesterol diet, regularly exercise, and limit alcohol intake.   -encouraged lifestyle modifications that support weight loss  -will continue to monitor    Lower back pain: no red flag signs/sxs. Tenderness to palpation of lumbar paraspinal muscles  -Lumbar Xray ordered  -PT ordered  -referral to orthopedic surgery spine ordered  -emergency Dept precautions discussed and reviewed with patient      Cervical radiculopathy: followed by ortho spine Dr. Grant  -cont management per ortho spine Dr. Grant  -PT referral ordered 3/21/24  -emergency Dept precautions discussed and reviewed with patient      Bilateral Shoulder  and knee pain: r/o OA, soft tissue structural issues, and rheumatologic disorder: uric acid, RF, ESR, CRP, MIRTA WNL.   -XR bilateral knees 3/21/24: IMPRESSION: Normal appearance of right knee. Small joint effusion of the left knee without bony abnormality.  -XR bilateral shoulders 3/21/24: IMPRESSION: Normal appearance of both shoulders.  -PT referral ordered 3/21/24  -orthopedic surgery referral ordered 3/21/24     Vit D level sub-optimal:  patient advised to please start over the counter Vit D3 2,000 units daily    GERD:  -cont PPI  -referral to GI ordered    ST Screening:   -GC/CT/trich ordered 3/21/24==> NOT YET COMPLETED==> patient advised to go to [facility] to complete     Colon Cancer Screening: NOW DUE   -colonoscopy ordered 3/21/24  -cologuard ordered 3/21/24  if patient decides he prefers cologuard testing over colonoscopy; patient advised to only complete EITHER colonoscopy OR cologuard and not both     Counseling:       Medication education:         Education:  The patient is counseled regarding potential side-effects of all new medications        Understanding:  Patient expressed understanding        Adherence:  No barriers to adherence identified        Immunizations Counseling  -flu vaccine, shingles, and TDAP now due==> declined today  -recommend updated COVID spike vaccine that can be obtained at local pharmacy     FOLLOW-UP: 4 weeks to discuss and review test results and 8 weeks for PHYSICAL     Discussed recommended plan of care with patient. Patient expressed understanding and agreement with plan of care. All of patient's questions were answered at the time. Patient had no additional questions at the time.                  Shea Yeung MD, PhD

## 2024-04-27 NOTE — PATIENT INSTRUCTIONS
It was nice seeing you today.    You can walk into [facility] to complete xray of lower back that I ordered.    Please follow a low carbohydrate, low salt, low cholesterol, low fat diet, regularly exercise, and limit alcohol intake.    For your high cholesterol, I recommend starting a cholesterol medication called atorvastatin.                 Please call radiology to schedule   CT Heart Calcium scoring     Please call referral line to schedule: 1) colonoscopy; and 2) physical therapy for lower back and neck; and 3) orthopedic surgery knee for knee pain and orthopedic surgery spine for lower back pain; and 4) gastroenterology/GI for for further evaluation of reflux    Please continue management of cervical radiculopathy by Dr. Grant     If  you prefer cologuard for colon cancer screening instead of a colonoscopy, I have ordered cologuard tat will be shipped your home. When you receive please follow the directions and send back to the company for processing.                I recommend receiving the TDAP booster that prevents tetanus and other infectious disease, shingles vaccine, and  the flu vaccine     I recommend the updated COVID vaccine that can be obtained at your local pharmacy     Please schedule a follow up with me in 8 weeks for a physical

## 2024-04-29 ENCOUNTER — OFFICE VISIT (OUTPATIENT)
Dept: PRIMARY CARE | Facility: CLINIC | Age: 54
End: 2024-04-29
Payer: COMMERCIAL

## 2024-04-29 ENCOUNTER — HOSPITAL ENCOUNTER (OUTPATIENT)
Dept: RADIOLOGY | Facility: CLINIC | Age: 54
Discharge: HOME | End: 2024-04-29
Payer: COMMERCIAL

## 2024-04-29 VITALS
TEMPERATURE: 96.8 F | HEART RATE: 82 BPM | BODY MASS INDEX: 31.87 KG/M2 | WEIGHT: 248.2 LBS | RESPIRATION RATE: 18 BRPM | DIASTOLIC BLOOD PRESSURE: 70 MMHG | SYSTOLIC BLOOD PRESSURE: 124 MMHG | OXYGEN SATURATION: 96 %

## 2024-04-29 DIAGNOSIS — M54.50 CHRONIC LOW BACK PAIN, UNSPECIFIED BACK PAIN LATERALITY, UNSPECIFIED WHETHER SCIATICA PRESENT: ICD-10-CM

## 2024-04-29 DIAGNOSIS — E78.2 MIXED HYPERLIPIDEMIA: ICD-10-CM

## 2024-04-29 DIAGNOSIS — M25.561 CHRONIC PAIN OF RIGHT KNEE: ICD-10-CM

## 2024-04-29 DIAGNOSIS — Z71.85 IMMUNIZATION COUNSELING: ICD-10-CM

## 2024-04-29 DIAGNOSIS — K21.9 GASTROESOPHAGEAL REFLUX DISEASE, UNSPECIFIED WHETHER ESOPHAGITIS PRESENT: ICD-10-CM

## 2024-04-29 DIAGNOSIS — Z12.11 COLON CANCER SCREENING: ICD-10-CM

## 2024-04-29 DIAGNOSIS — M54.12 CERVICAL RADICULOPATHY: ICD-10-CM

## 2024-04-29 DIAGNOSIS — E78.6 LOW HDL (UNDER 40): ICD-10-CM

## 2024-04-29 DIAGNOSIS — G89.29 CHRONIC PAIN OF RIGHT KNEE: ICD-10-CM

## 2024-04-29 DIAGNOSIS — Z11.3 ROUTINE SCREENING FOR STI (SEXUALLY TRANSMITTED INFECTION): ICD-10-CM

## 2024-04-29 DIAGNOSIS — G89.29 CHRONIC LOW BACK PAIN, UNSPECIFIED BACK PAIN LATERALITY, UNSPECIFIED WHETHER SCIATICA PRESENT: ICD-10-CM

## 2024-04-29 DIAGNOSIS — E55.9 VITAMIN D DEFICIENCY: ICD-10-CM

## 2024-04-29 DIAGNOSIS — F41.9 ANXIETY: ICD-10-CM

## 2024-04-29 DIAGNOSIS — M25.562 CHRONIC PAIN OF LEFT KNEE: ICD-10-CM

## 2024-04-29 DIAGNOSIS — G89.29 CHRONIC PAIN OF LEFT KNEE: ICD-10-CM

## 2024-04-29 DIAGNOSIS — R73.03 PREDIABETES: Primary | ICD-10-CM

## 2024-04-29 PROCEDURE — 3008F BODY MASS INDEX DOCD: CPT | Performed by: FAMILY MEDICINE

## 2024-04-29 PROCEDURE — 72110 X-RAY EXAM L-2 SPINE 4/>VWS: CPT

## 2024-04-29 PROCEDURE — 1036F TOBACCO NON-USER: CPT | Performed by: FAMILY MEDICINE

## 2024-04-29 PROCEDURE — 72110 X-RAY EXAM L-2 SPINE 4/>VWS: CPT | Performed by: RADIOLOGY

## 2024-04-29 PROCEDURE — 99214 OFFICE O/P EST MOD 30 MIN: CPT | Performed by: FAMILY MEDICINE

## 2024-04-29 RX ORDER — DICLOFENAC SODIUM 10 MG/G
4 GEL TOPICAL 4 TIMES DAILY
Qty: 100 G | Refills: 1 | Status: SHIPPED | OUTPATIENT
Start: 2024-04-29

## 2024-04-29 ASSESSMENT — PATIENT HEALTH QUESTIONNAIRE - PHQ9
SUM OF ALL RESPONSES TO PHQ9 QUESTIONS 1 AND 2: 0
2. FEELING DOWN, DEPRESSED OR HOPELESS: NOT AT ALL
1. LITTLE INTEREST OR PLEASURE IN DOING THINGS: NOT AT ALL

## 2024-04-29 ASSESSMENT — PAIN SCALES - GENERAL: PAINLEVEL: 4

## 2024-04-29 ASSESSMENT — ENCOUNTER SYMPTOMS
PARESIS: 0
PERIANAL NUMBNESS: 0
ABDOMINAL PAIN: 0
DEPRESSION: 0
HEADACHES: 0
WEAKNESS: 1
DYSURIA: 0
NUMBNESS: 0
LOSS OF SENSATION IN FEET: 0
TINGLING: 0
OCCASIONAL FEELINGS OF UNSTEADINESS: 0
LEG PAIN: 1
BACK PAIN: 1
FEVER: 0
PARESTHESIAS: 0
WEIGHT LOSS: 0
BOWEL INCONTINENCE: 0

## 2024-04-29 ASSESSMENT — COLUMBIA-SUICIDE SEVERITY RATING SCALE - C-SSRS
6. HAVE YOU EVER DONE ANYTHING, STARTED TO DO ANYTHING, OR PREPARED TO DO ANYTHING TO END YOUR LIFE?: NO
2. HAVE YOU ACTUALLY HAD ANY THOUGHTS OF KILLING YOURSELF?: NO
1. IN THE PAST MONTH, HAVE YOU WISHED YOU WERE DEAD OR WISHED YOU COULD GO TO SLEEP AND NOT WAKE UP?: NO

## 2024-05-15 ENCOUNTER — EVALUATION (OUTPATIENT)
Dept: PHYSICAL THERAPY | Facility: CLINIC | Age: 54
End: 2024-05-15
Payer: COMMERCIAL

## 2024-05-15 DIAGNOSIS — M54.12 CERVICAL RADICULOPATHY: ICD-10-CM

## 2024-05-15 PROCEDURE — 97161 PT EVAL LOW COMPLEX 20 MIN: CPT | Mod: GP | Performed by: PHYSICAL THERAPIST

## 2024-05-15 PROCEDURE — 97530 THERAPEUTIC ACTIVITIES: CPT | Mod: GP | Performed by: PHYSICAL THERAPIST

## 2024-05-15 PROCEDURE — 97110 THERAPEUTIC EXERCISES: CPT | Mod: GP | Performed by: PHYSICAL THERAPIST

## 2024-05-15 ASSESSMENT — ENCOUNTER SYMPTOMS
LOSS OF SENSATION IN FEET: 0
OCCASIONAL FEELINGS OF UNSTEADINESS: 0
DEPRESSION: 0

## 2024-05-15 NOTE — PROGRESS NOTES
"Physical Therapy Evaluation and Treatment      Patient Name: Aric Posey \"Lyle\"  MRN: [id number]  Today's Date: 5/15/2024  Time Calculation  Start Time: 0855  Stop Time: 0933  Time Calculation (min): 38 min  PT Therapeutic Procedures Time Entry  Therapeutic Exercise Time Entry: 14  Therapeutic Activity Time Entry: 10      Insurance:  Visit number: 1 of 5  Authorization info: 60V/YR  Insurance Type: Payor: MEDICAL [facility] / Plan: [employer] / Product Type: *No Product type* /     Current Problem:   1. Cervical radiculopathy  Referral to Physical Therapy    Follow Up In Physical Therapy          Subjective    General:  Pt has had degenerative discs in neck and low back for about 15 years now. Pain however has been getting worse and worse. Neck pain is like \"rice krispies.\" Pain is all over the neck, mainly in the center of the spine. No specific injury, no injections. Pain every night, memory foam pillow helps support neck when sleeping. Feels like his range of motion is limited, especially when turning his head. 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 Numbness and tingling into both arms/hands. Putting on coat can sometimes hurt. Cutting grass results in numbness and pain. Occasional headaches.     He would be interested in traction. Also will be seeing MD for lumbar spine    Precautions: None   Pain: 3/10      Objective   ROM   Cervical spine AROM:  Flex 100%  Ext 25%  Lat flex L/R 25%  Rot L/R 25%  (Pain with all)      MMT   Bilat UE strength:  Sh flex 4+/5  Sh ABD 4+/5  Elbow flex 5/5  Elbow ext 5/5      Palpation   TTP cervical vertebrae and suboccipitals.  TTP bilat UT and LS      Flexibility   Moderate UT tightness, bilaterally      Special Tests   Compression: Negative   Lat flex L with EXT: Positive   Lat flex R with EXT: Positive     Outcome Measures:  Neck Disability Index: 9/50    Treatments:  Therapeutic " Exercise:  UT stretch, R/L 30 seconds ea  Palms out stretch 30 seconds  Cervical retraction x10  Scap retraction x10    Therapeutic activity:  Educated pt on eval findings and POC  Educated pt on anatomy of spine  Discussed work activities     Assessment   Assessment:   Pt is a 53 y.o. male with cervical radiculopathy due to possible disc herniation. Pt with decreased ROM, reduced strength, numbness/tingling, abnormal posture, and flexibility limitations. Pt will benefit from skilled PT to address the above deficits for improvement in functional activities.       Low complexity due to patient's clinical presentation being stable and uncomplicated by any significant comorbidities that may affect rehab tolerance and progression.     Plan:   Treatment/Interventions: Dry needling, Education/ Instruction, Manual therapy, Mechanical traction, Neuromuscular re-education, Self care/ home management, Therapeutic activities, Therapeutic exercises  PT Plan: Skilled PT  PT Frequency: 1 time per week  Duration: 4 more visits  Number of Treatments Authorized: 60  Rehab Potential: Good  Plan of Care Agreement: Patient      Goals:   Active       Mobility       Goal 1       Start:  05/15/24    Expected End:  08/13/24       Pt will improve cervical spine AROM to WNL to improve I/ADLs.         Goal 2       Start:  05/15/24    Expected End:  08/13/24       Pt will improve UE flexibility to WNL to improve I/ADLs.            Pain       Goal 1       Start:  05/15/24    Expected End:  08/13/24       Pt will perform all work activities with 0/10 pain.         Goal 2       Start:  05/15/24    Expected End:  08/13/24       Pt will perform all house/yard work with 0/10 pain.

## 2024-05-31 ENCOUNTER — APPOINTMENT (OUTPATIENT)
Dept: PHYSICAL THERAPY | Facility: CLINIC | Age: 54
End: 2024-05-31
Payer: COMMERCIAL

## 2024-06-03 ENCOUNTER — TREATMENT (OUTPATIENT)
Dept: PHYSICAL THERAPY | Facility: CLINIC | Age: 54
End: 2024-06-03
Payer: COMMERCIAL

## 2024-06-03 DIAGNOSIS — M54.12 CERVICAL RADICULOPATHY: ICD-10-CM

## 2024-06-03 PROCEDURE — 97110 THERAPEUTIC EXERCISES: CPT | Mod: GP,CQ

## 2024-06-03 PROCEDURE — 97140 MANUAL THERAPY 1/> REGIONS: CPT | Mod: GP,CQ

## 2024-06-03 ASSESSMENT — PAIN SCALES - GENERAL: PAINLEVEL_OUTOF10: 4

## 2024-06-03 NOTE — PROGRESS NOTES
"Physical Therapy Treatment    Patient Name: Aric Posey  MRN: [id number]  Today's Date: 6/3/2024  Time Calculation  Start Time: 1145  Stop Time: 1232  Time Calculation (min): 47 min  PT Therapeutic Procedures Time Entry  Manual Therapy Time Entry: 19  Therapeutic Exercise Time Entry: 24    Insurance:  Visit number: 2 of 5  Authorization info: MMO - NO AUTH / 60V/yr - 0 used / 80% COVERAGE / OOP $4000 - $269 met / 5/14/24  Insurance Type: Payor: MEDICAL [employer] [facility] / Plan: [employer]ICAL [employer] / Product Type: *No Product type* /     Current Problem   1. Cervical radiculopathy  Follow Up In Physical Therapy          Subjective   Pt reports mild irritation with HEP activities.    General   Reason for Referral: Cervical radiculopathy  Referred By: Ever Grant MD  Precautions:  Precautions  Precautions Comment: None  Pain   Pain Score: 4  Pain Type: Chronic pain  Pain Location: Neck  Pain Orientation: Left, Right  Pain Radiating Towards: Shoulders  Post Treatment Pain Level 2/10    Objective   Pt requires frequent initial postural cues during ther ex progressions.     Treatments:  Therapeutic Exercise:  Therapeutic Exercise  Therapeutic Exercise Performed: Yes  Therapeutic Exercise Activity 1: UBE BWD/FWD 2' each  Therapeutic Exercise Activity 2: Scap retractions 5\" hold 2x10  Therapeutic Exercise Activity 3: Rows BTB 2x10  Therapeutic Exercise Activity 4: Shoulder extension BTB 2x10  Therapeutic Exercise Activity 5: Utrap stretches 30\"x3 L/R each  Therapeutic Exercise Activity 6: Chin tucks 5\" hold x10    Manual:  Manual Therapy  Manual Therapy Performed: Yes  Manual Therapy Activity 1: DSTM, MFR B utraps, levators  Manual Therapy Activity 2: STM cervical PSPs  Manual Therapy Activity 3: Cervical distraction and SOR.       Assessment   Assessment:   PT Assessment  PT Assessment Results: Decreased strength, Decreased range of motion, Decreased mobility, Pain  Rehab Prognosis: Good  Evaluation/Treatment " Tolerance: Patient tolerated treatment well  Assessment Comment: Pt demonstrates poor to fair postural awareness this visit, mils pain level reduction after treatment.    Plan:   MECHANICAL TRACTION NEXT VISIT  OP PT Plan  Treatment/Interventions: Dry needling, Education/ Instruction, Manual therapy, Mechanical traction, Neuromuscular re-education, Self care/ home management, Therapeutic activities, Therapeutic exercises  PT Plan: Skilled PT  PT Frequency: 1 time per week  Duration: 4 more visits  Number of Treatments Authorized: 60  Rehab Potential: Good  Plan of Care Agreement: Patient    OP EDUCATION:  Outpatient Education  Individual(s) Educated: Patient  Education Provided: Body Mechanics, Home Exercise Program  Patient/Caregiver Demonstrated Understanding: yes  Patient Response to Education: Patient/Caregiver Asked Appropriate Questions, Patient/Caregiver Performed Return Demonstration of Exercises/Activities, Patient/Caregiver Verbalized Understanding of Information    Goals:   Active       Mobility       Goal 1       Start:  05/15/24    Expected End:  08/13/24       Pt will improve cervical spine AROM to WNL to improve I/ADLs.         Goal 2       Start:  05/15/24    Expected End:  08/13/24       Pt will improve UE flexibility to WNL to improve I/ADLs.            Pain       Goal 1       Start:  05/15/24    Expected End:  08/13/24       Pt will perform all work activities with 0/10 pain.         Goal 2       Start:  05/15/24    Expected End:  08/13/24       Pt will perform all house/yard work with 0/10 pain.

## 2024-06-10 ENCOUNTER — APPOINTMENT (OUTPATIENT)
Dept: PHYSICAL THERAPY | Facility: CLINIC | Age: 54
End: 2024-06-10
Payer: COMMERCIAL

## 2024-06-17 ENCOUNTER — APPOINTMENT (OUTPATIENT)
Dept: PHYSICAL THERAPY | Facility: CLINIC | Age: 54
End: 2024-06-17
Payer: COMMERCIAL

## 2024-07-11 ENCOUNTER — DOCUMENTATION (OUTPATIENT)
Dept: PHYSICAL THERAPY | Facility: CLINIC | Age: 54
End: 2024-07-11
Payer: COMMERCIAL

## 2024-07-11 NOTE — PROGRESS NOTES
"Physical Therapy    Discharge Summary    Name: Aric DEAL Taty \"Lyle\"  MRN: [id number]  : 1970  Date: 24    Discharge Summary: PT    Discharge Information: Date of evaluation 05/15/2024 and Number of attended visits 2    Therapy Summary: Pt only attended one follow-up visit after initial eval. Was going to attempt traction at next visit.     Discharge Status: Return to PCP as needed.     Rehab Discharge Reason: Failed to schedule and/or keep follow-up appointment(s)  "

## 2024-12-01 ENCOUNTER — OFFICE VISIT (OUTPATIENT)
Dept: URGENT CARE | Age: 54
End: 2024-12-01
Payer: COMMERCIAL

## 2024-12-01 VITALS
OXYGEN SATURATION: 99 % | WEIGHT: 250 LBS | DIASTOLIC BLOOD PRESSURE: 94 MMHG | BODY MASS INDEX: 33.13 KG/M2 | HEIGHT: 73 IN | SYSTOLIC BLOOD PRESSURE: 144 MMHG | TEMPERATURE: 98.5 F | HEART RATE: 90 BPM | RESPIRATION RATE: 18 BRPM

## 2024-12-01 DIAGNOSIS — M70.22 OLECRANON BURSITIS OF LEFT ELBOW: Primary | ICD-10-CM

## 2024-12-01 RX ORDER — PREDNISONE 20 MG/1
TABLET ORAL
Qty: 10 TABLET | Refills: 0 | Status: SHIPPED | OUTPATIENT
Start: 2024-12-01

## 2024-12-01 RX ORDER — IBUPROFEN 600 MG/1
600 TABLET ORAL 4 TIMES DAILY PRN
Qty: 90 TABLET | Refills: 0 | Status: SHIPPED | OUTPATIENT
Start: 2024-12-01 | End: 2025-12-01

## 2024-12-01 NOTE — PROGRESS NOTES
"Subjective   Patient ID: Aric Posey \"Lyle\" is a 54 y.o. male. They present today with a chief complaint of Elbow Pain.    History of Present Illness  Patient is a pleasant 54-year-old white male, no significant past medical history, presented to clinic with complaint of elbow swelling.  Patient is reporting approximately 2 to 3-day history of increasing swelling in the posterior aspect of his left elbow.  He denies any trauma or injury to the elbow.  States it is red and swollen just over the posterior portion.  Denies any numbness or tingling.  He has been using Tylenol at home with only mild short-term relief.  Denies any bruising.        Elbow Pain      Past Medical History  Allergies as of 12/01/2024 - Reviewed 04/29/2024   Allergen Reaction Noted    Cyclobenzaprine hcl Rash 09/09/2011       (Not in a hospital admission)         Past Medical History:   Diagnosis Date    Anxiety     Arthritis     DJD (degenerative joint disease)     GERD (gastroesophageal reflux disease)     Other specified health status     No known health problems       Past Surgical History:   Procedure Laterality Date    EYE SURGERY      OTHER SURGICAL HISTORY  07/23/2021    Eye surgery        reports that he has quit smoking. His smoking use included cigarettes. He has a 10 pack-year smoking history. He has never used smokeless tobacco. He reports that he does not currently use alcohol. He reports that he does not currently use drugs after having used the following drugs: Marijuana.    Review of Systems  Review of Systems                               Objective    Vitals:    12/01/24 0851   BP: (!) 144/94   Pulse: 90   Resp: 18   Temp: 36.9 °C (98.5 °F)   TempSrc: Oral   SpO2: 99%   Weight: 113 kg (250 lb)   Height: 1.854 m (6' 1\")     No LMP for male patient.    Physical Exam  General: Vitals Noted. No distress. Normocephalic.     HEENT: TMs normal, EOMI, normal conjunctiva, patent nares, Normal OP    Neck: Supple with no adenopathy. "     Cardiac: Regular Rate and Rhythm. No murmur.     Pulmonary: Equal breath sounds bilaterally. No wheezes, rhonchi, or rales.    Abdomen: Soft, non-tender, with normal bowel sounds.     Musculoskeletal: Evaluation of left elbow does reveal localized edema over the olecranon with some overlying erythema.  Is moderately tender to palpation.  No associated warmth.  Neurovascular intact distally cap refill less than 2 seconds.  He has full strength with flexion extension of the elbow as well as with supination and pronation of the wrist.  Otherwise moves all extremities, no effusion, no edema.     Skin: No obvious rashes.  Procedures    Point of Care Test & Imaging Results from this visit    No results found.    Diagnostic study results (if any) were reviewed by Santiago Butt PA-C.    Assessment/Plan   Allergies, medications, history, and pertinent labs/EKGs/Imaging reviewed by Santiago Butt PA-C.     Medical Decision Making  Patient was seen eval in the clinic to complaint of left elbow swelling.  On exam patient is nontoxic well-appearing respect comfortably no acute distress.  Vital signs are stable, afebrile.  Chest is clear, heart is regular, belly is diffusely soft and nontender peer evaluation of left elbow as above concerning for an acute olecranon bursitis.  Will advise NSAIDs in the form of ibuprofen 600 mg every 6 hours and also provided 5-day course of prednisone.  Advised to get a compression elbow sleeve as well for compression.  Advised icing and elevation.  Will place referral to orthopedics to be used as needed if not improving with supportive care measures.  Also advised to follow-up with his primary care physician in the next week.  We discharged home at this time.  Reviewed my impression, plan, strict return precautions with the patient.  He expresses understanding and agreement plan of care.    Orders and Diagnoses  Diagnoses and all orders for this visit:  Olecranon bursitis of left  elbow  -     predniSONE (Deltasone) 20 mg tablet; Take two tablets per day for 5 days  -     ibuprofen 600 mg tablet; Take 1 tablet (600 mg) by mouth 4 times a day as needed for mild pain (1 - 3) (pain).  -     Referral to Orthopaedic Surgery; Future        Medical Admin Record      Follow Up Instructions  No follow-ups on file.    Patient disposition: Home    Electronically signed by Santiago Butt PA-C  9:02 AM

## 2024-12-01 NOTE — PATIENT INSTRUCTIONS
If you do not hear from central scheduling in the next 48 hours to set up your follow up appointment, please call [phone]

## 2025-01-06 DIAGNOSIS — G47.00 INSOMNIA, UNSPECIFIED TYPE: Primary | ICD-10-CM

## 2025-01-06 RX ORDER — TRAZODONE HYDROCHLORIDE 100 MG/1
100 TABLET ORAL NIGHTLY
Qty: 30 TABLET | Refills: 0 | Status: SHIPPED | OUTPATIENT
Start: 2025-01-06

## 2025-02-06 DIAGNOSIS — G47.00 INSOMNIA, UNSPECIFIED TYPE: ICD-10-CM

## 2025-02-11 RX ORDER — TRAZODONE HYDROCHLORIDE 100 MG/1
TABLET ORAL
Qty: 30 TABLET | Refills: 1 | Status: SHIPPED | OUTPATIENT
Start: 2025-02-11 | End: 2025-02-11 | Stop reason: SDUPTHER

## 2025-04-16 DIAGNOSIS — G47.00 INSOMNIA, UNSPECIFIED TYPE: ICD-10-CM

## 2025-04-16 RX ORDER — TRAZODONE HYDROCHLORIDE 100 MG/1
TABLET ORAL
Qty: 30 TABLET | Refills: 0 | OUTPATIENT
Start: 2025-04-16

## 2025-04-16 NOTE — TELEPHONE ENCOUNTER
Patient needs to schedule a follow up appt before refilling; once he has a scheduled appt then I can refill. Thank you

## 2025-05-07 ENCOUNTER — OFFICE VISIT (OUTPATIENT)
Dept: URGENT CARE | Age: 55
End: 2025-05-07
Payer: COMMERCIAL

## 2025-05-07 VITALS
RESPIRATION RATE: 19 BRPM | BODY MASS INDEX: 33.86 KG/M2 | DIASTOLIC BLOOD PRESSURE: 91 MMHG | WEIGHT: 250 LBS | HEART RATE: 83 BPM | HEIGHT: 72 IN | OXYGEN SATURATION: 98 % | TEMPERATURE: 98.3 F | SYSTOLIC BLOOD PRESSURE: 138 MMHG

## 2025-05-07 DIAGNOSIS — R10.9 FLANK PAIN: ICD-10-CM

## 2025-05-07 DIAGNOSIS — N30.01 ACUTE CYSTITIS WITH HEMATURIA: Primary | ICD-10-CM

## 2025-05-07 LAB
POC APPEARANCE, URINE: CLEAR
POC BILIRUBIN, URINE: NEGATIVE
POC BLOOD, URINE: ABNORMAL
POC COLOR, URINE: YELLOW
POC GLUCOSE, URINE: NEGATIVE MG/DL
POC KETONES, URINE: NEGATIVE MG/DL
POC LEUKOCYTES, URINE: NEGATIVE
POC NITRITE,URINE: NEGATIVE
POC PH, URINE: 6 PH
POC PROTEIN, URINE: NEGATIVE MG/DL
POC SPECIFIC GRAVITY, URINE: >=1.03
POC UROBILINOGEN, URINE: 0.2 EU/DL

## 2025-05-07 RX ORDER — CEPHALEXIN 500 MG/1
500 CAPSULE ORAL 2 TIMES DAILY
Qty: 20 CAPSULE | Refills: 0 | Status: SHIPPED | OUTPATIENT
Start: 2025-05-07 | End: 2025-05-17

## 2025-05-07 RX ORDER — TAMSULOSIN HYDROCHLORIDE 0.4 MG/1
0.4 CAPSULE ORAL DAILY
Qty: 3 CAPSULE | Refills: 0 | Status: SHIPPED | OUTPATIENT
Start: 2025-05-07 | End: 2025-05-10

## 2025-05-07 ASSESSMENT — ENCOUNTER SYMPTOMS: FLANK PAIN: 1

## 2025-05-07 NOTE — PROGRESS NOTES
"Subjective   Patient ID: Aric Posey \"Lyle\" is a 54 y.o. male. They present today with a chief complaint of Abdominal Pain (Left sided pain below rib cage and laterally located. Deep breath pain. Movement pain and pressure pain since yesterday).    History of Present Illness  Patient 54-year-old male present today with complaints of left flank pain since yesterday.  He denies chills or fever, abdominal pain or back pain, vomiting, nausea, or diarrhea.    Past Medical History  Allergies as of 05/07/2025 - Reviewed 05/07/2025   Allergen Reaction Noted    Cyclobenzaprine hcl Rash 09/09/2011       Prescriptions Prior to Admission[1]     Medical History[2]    Surgical History[3]     reports that he has quit smoking. His smoking use included cigarettes. He has a 10 pack-year smoking history. He has never used smokeless tobacco. He reports that he does not currently use alcohol. He reports that he does not currently use drugs after having used the following drugs: Marijuana.    Review of Systems  Review of Systems   Genitourinary:  Positive for flank pain.   All other systems reviewed and are negative.                                 Objective    Vitals:    05/07/25 1644   BP: (!) 138/91   Pulse: 83   Resp: 19   Temp: 36.8 °C (98.3 °F)   SpO2: 98%   Weight: 113 kg (250 lb)   Height: 1.829 m (6')     No LMP for male patient.    Physical Exam  Vitals reviewed.   General: Vitals Noted. No distress. Normocephalic.  Cardiovascular:     Heart sounds: Normal heart sounds, S1 normal and S2 normal. No murmur heard.     No friction rub.   Pulmonary:      Effort: Pulmonary effort is normal.      Breath sounds:  Lungs clear to auscultation bilaterally   HEENT: Pharynx and tonsils are not hyperemic, and without exudate.   Neck: Supple with no adenopathy.  Lymphadenopathy:   No cervical adenopathy on palpation  Lower Body: No right inguinal adenopathy. No left inguinal adenopathy.   Abdominal:      Palpations: There is no " hepatomegaly, splenomegaly or mass. Abdomen is soft, non-tender, and non-distended. No suprapubic tenderness. Left sided CVA tenderness on exam.    Skin:     Comments: no rash   Neurological:      Cranial Nerves: Cranial nerves 2-12 are intact.      Sensory: No sensory deficit.      Motor: Motor function is intact.      Deep Tendon Reflexes: Reflexes are normal and symmetric.       Procedures    Point of Care Test & Imaging Results from this visit  Results for orders placed or performed in visit on 05/07/25   POCT UA Automated manually resulted   Result Value Ref Range    POC Color, Urine Yellow Straw, Yellow, Light-Yellow    POC Appearance, Urine Clear Clear    POC Glucose, Urine NEGATIVE NEGATIVE mg/dl    POC Bilirubin, Urine NEGATIVE NEGATIVE    POC Ketones, Urine NEGATIVE NEGATIVE mg/dl    POC Specific Gravity, Urine >=1.030 1.005 - 1.035    POC Blood, Urine SMALL (1+) (A) NEGATIVE    POC PH, Urine 6.0 No Reference Range Established PH    POC Protein, Urine NEGATIVE NEGATIVE mg/dl    POC Urobilinogen, Urine 0.2 0.2, 1.0 EU/DL    Poc Nitrite, Urine NEGATIVE NEGATIVE    POC Leukocytes, Urine NEGATIVE NEGATIVE      Imaging  No results found.    Cardiology, Vascular, and Other Imaging  No other imaging results found for the past 2 days      Diagnostic study results (if any) were reviewed by ABDIRAHMAN Santos.    Assessment/Plan   Allergies, medications, history, and pertinent labs/EKGs/Imaging reviewed by ABDIRAHMAN Santos.     Medical Decision Making  Patient is alert and oriented x 3 in no acute distress.  Presenting with left flank pain since yesterday.  On examination indicative for the left-sided CVA tenderness with pain radiating towards abdominal wall.  Patient is nontoxic without signs of systemic infection.  Urinalysis is positive for small amount of blood which could be indicative of acute cystitis in the setting of kidney infection/kidney stone.  Urine culture sent out to the lab.  Patient  will be started on cephalexin today with kidney coverage in mind additionally to tamsulosin in the setting of possible kidney stone passing.  Patient was advised that he should undergo evaluation with a CAT scan for definitive answer.  Patient is reluctant today to present to emergency and agreed to monitor his symptoms at home given no systemic infection is present today.  Patient was advised with ongoing and worsening symptoms of flank pain, in the setting of chills, vomiting, abdominal pain or diarrhea he should present to emergency immediately for further evaluation and treatment.  Patient verbalized understanding and agreed with the plan.    Orders and Diagnoses  Diagnoses and all orders for this visit:  Acute cystitis with hematuria  -     cephalexin (Keflex) 500 mg capsule; Take 1 capsule (500 mg) by mouth 2 times a day for 10 days.  Flank pain  -     POCT UA Automated manually resulted  -     Urine Culture  -     tamsulosin (Flomax) 0.4 mg 24 hr capsule; Take 1 capsule (0.4 mg) by mouth once daily for 3 days. Do not crush, chew, or split.      Medical Admin Record      Patient disposition: Home    Electronically signed by ABDIRAHMAN Santos  5:39 PM           [1] (Not in a hospital admission)   [2]   Past Medical History:  Diagnosis Date    Anxiety     Arthritis     DJD (degenerative joint disease)     GERD (gastroesophageal reflux disease)     Other specified health status     No known health problems   [3]   Past Surgical History:  Procedure Laterality Date    EYE SURGERY      OTHER SURGICAL HISTORY  07/23/2021    Eye surgery

## 2025-05-09 LAB — BACTERIA UR CULT: NORMAL

## 2025-05-21 ENCOUNTER — OFFICE VISIT (OUTPATIENT)
Dept: URGENT CARE | Age: 55
End: 2025-05-21
Payer: COMMERCIAL

## 2025-05-21 VITALS
WEIGHT: 247 LBS | HEIGHT: 72 IN | OXYGEN SATURATION: 97 % | RESPIRATION RATE: 18 BRPM | SYSTOLIC BLOOD PRESSURE: 148 MMHG | BODY MASS INDEX: 33.46 KG/M2 | DIASTOLIC BLOOD PRESSURE: 87 MMHG | TEMPERATURE: 98.5 F | HEART RATE: 107 BPM

## 2025-05-21 DIAGNOSIS — M54.10 RADICULAR LOW BACK PAIN: Primary | ICD-10-CM

## 2025-05-21 RX ORDER — KETOROLAC TROMETHAMINE 10 MG/1
10 TABLET, FILM COATED ORAL EVERY 6 HOURS PRN
Qty: 12 TABLET | Refills: 0 | Status: SHIPPED | OUTPATIENT
Start: 2025-05-21 | End: 2025-05-24

## 2025-05-21 RX ORDER — KETOROLAC TROMETHAMINE 30 MG/ML
30 INJECTION, SOLUTION INTRAMUSCULAR; INTRAVENOUS ONCE
Status: COMPLETED | OUTPATIENT
Start: 2025-05-21 | End: 2025-05-21

## 2025-05-21 RX ORDER — METHOCARBAMOL 500 MG/1
500 TABLET, FILM COATED ORAL 2 TIMES DAILY
Qty: 20 TABLET | Refills: 0 | Status: SHIPPED | OUTPATIENT
Start: 2025-05-21 | End: 2025-05-31

## 2025-05-21 RX ORDER — METHYLPREDNISOLONE SODIUM SUCCINATE 125 MG/2ML
125 INJECTION INTRAMUSCULAR; INTRAVENOUS ONCE
Status: COMPLETED | OUTPATIENT
Start: 2025-05-21 | End: 2025-05-21

## 2025-05-21 RX ADMIN — KETOROLAC TROMETHAMINE 30 MG: 30 INJECTION, SOLUTION INTRAMUSCULAR; INTRAVENOUS at 11:52

## 2025-05-21 RX ADMIN — METHYLPREDNISOLONE SODIUM SUCCINATE 125 MG: 125 INJECTION INTRAMUSCULAR; INTRAVENOUS at 11:55

## 2025-05-21 NOTE — PATIENT INSTRUCTIONS
VISIT SUMMARY:  Today, you were seen for acute lower back pain that started yesterday while repairing window screens. You have a history of degenerative disc disorder in both your lower back and neck. The pain is severe, rated at 7 out of 10, and occasionally radiates down your legs. You have tried ibuprofen and Aleve without relief.    YOUR PLAN:  -LOW BACK PAIN WITH RADICULOPATHY: This means you have lower back pain that radiates to your legs, likely due to nerve irritation or a herniated disc. We administered a Solu Medrol injection to reduce inflammation and radicular symptoms, and a Toradol injection for pain relief. You should alternate Tylenol with Toradol to prevent kidney damage. We also prescribed analgesics and muscle relaxants. Avoid prolonged bed rest, but engage in gentle movement and stretching when comfortable. Apply moist heat to the affected area.    -DEGENERATIVE DISC DISEASE, LUMBAR AND CERVICAL: This is a chronic condition where the discs in your spine wear down, causing pain and sometimes bone spurs. You have experienced this in both your lower back and neck. Discuss your persistent symptoms with your primary care provider for further evaluation and consider a referral to a different specialist if needed.    INSTRUCTIONS:  Follow up with your primary care provider to discuss persistent symptoms and potential further evaluation. Consider a referral to a different specialist if symptoms persist.

## 2025-05-21 NOTE — PROGRESS NOTES
"Subjective   Patient ID: Aric Posey \"Jim" is a 54 y.o. male who presents for Back Injury (Yesterday/).  History of Present Illness  Aric Posey \"Jim" is a 54 year old male with degenerative disc disorder who presents with acute lower back pain.    He experienced acute lower back pain that began yesterday while repairing window screens. He felt something 'give' in his lower back when leaning forward, causing difficulty standing upright for several minutes. The pain is rated as 7 out of 10.    He has a history of degenerative disc disorder in both his lower back and neck. Previous episodes of similar movements have caused him to fall to his knees, but this episode feels different. The pain is primarily localized in the lower back but occasionally radiates down his legs. No numbness, tingling, or loss of bowel or bladder control.    He has attempted self-management with ibuprofen and Aleve, including an 800 mg prescription of ibuprofen, but reports no relief from these medications.    ROS is negative unless otherwise stated in HPI.       Objective     /87   Pulse 107   Temp 36.9 °C (98.5 °F) (Oral)   Resp 18   Ht 1.829 m (6')   Wt 112 kg (247 lb)   SpO2 97%   BMI 33.50 kg/m²        VS: As documented in the triage note and EMR flowsheet from this visit was reviewed  General: Well appearing. No acute distress.   Eyes:  Extraocular movements grossly intact. No scleral icterus.   Head: Atraumatic. Normocephalic.     Neck: No meningismus. No gross masses. Full movement through range of motion  CV: Regular rhythm. No murmurs, rubs, gallops appreciated.   Resp: Clear to auscultation bilaterally. No respiratory distress.    MSK: Symmetric muscle bulk. No gross step offs or deformities. No midline tenderness to the thoracic or lumbar spine.   Skin: Warm, dry. No rashes  Neuro: CN II-VII intact. A&O x3. Speech fluent. Alert. Moving all extremities. Ambulates with normal gait  Psych: Appropriate mood and " affect for situation      Assessment & Plan  Low back pain with radiculopathy    He experiences an acute exacerbation of low back pain with radicular symptoms following physical activity, rating the pain at 7/10 with radiation to the legs. There is no numbness, tingling, or bowel/bladder dysfunction.  Administer Solu Medrol for inflammation and radicular symptoms. Administer Toradol for pain relief, alternating with Tylenol as needed. Prescribe analgesics and muscle relaxants, advising caution with activities requiring alertness. Advise against prolonged bed rest; encourage movement and gentle stretching when comfortable. Recommend moist heat application.    Degenerative disc disease, lumbar and cervical    He has chronic degenerative disc disease in the lumbar and cervical regions with confirmed bone spurs in the neck. Previous imaging confirmed degenerative changes. There is no current specialist follow-up, and he plans to discuss further with his primary care provider. Discuss persistent symptoms with the primary care provider for potential further evaluation. Consider referral to a different specialist if symptoms persist.      Point of Care Test & Imaging Results from this visit  No results found for this visit on 05/21/25.   Imaging  No results found.    Cardiology, Vascular, and Other Imaging  No other imaging results found for the past 2 days      Diagnostic study results (if any) were reviewed by Jacklyn Santos PA-C.    Assessment/Plan   Allergies, medications, history, and pertinent labs/EKGs/Imaging reviewed by Jacklyn Santos PA-C.       Disposition:  Home      Jacklyn Santos PA-C     This medical note was created with the assistance of artificial intelligence (AI) for documentation purposes. The content has been reviewed and confirmed by the healthcare provider for accuracy and completeness. Patient consented to the use of audio recording and use of AI during their visit.

## 2025-05-28 ENCOUNTER — HOSPITAL ENCOUNTER (EMERGENCY)
Facility: HOSPITAL | Age: 55
Discharge: HOME | End: 2025-05-28
Payer: COMMERCIAL

## 2025-05-28 ENCOUNTER — APPOINTMENT (OUTPATIENT)
Dept: RADIOLOGY | Facility: HOSPITAL | Age: 55
End: 2025-05-28
Payer: COMMERCIAL

## 2025-05-28 VITALS
HEIGHT: 72 IN | WEIGHT: 247 LBS | RESPIRATION RATE: 18 BRPM | SYSTOLIC BLOOD PRESSURE: 136 MMHG | DIASTOLIC BLOOD PRESSURE: 87 MMHG | BODY MASS INDEX: 33.46 KG/M2 | OXYGEN SATURATION: 100 % | HEART RATE: 81 BPM | TEMPERATURE: 98 F

## 2025-05-28 DIAGNOSIS — K57.32 DIVERTICULITIS OF DESCENDING COLON: Primary | ICD-10-CM

## 2025-05-28 LAB
ALBUMIN SERPL BCP-MCNC: 4.3 G/DL (ref 3.4–5)
ALP SERPL-CCNC: 90 U/L (ref 33–120)
ALT SERPL W P-5'-P-CCNC: 29 U/L (ref 10–52)
ANION GAP SERPL CALCULATED.3IONS-SCNC: 8 MMOL/L (ref 10–20)
APPEARANCE UR: CLEAR
AST SERPL W P-5'-P-CCNC: 17 U/L (ref 9–39)
BASOPHILS # BLD AUTO: 0.03 X10*3/UL (ref 0–0.1)
BASOPHILS NFR BLD AUTO: 0.3 %
BILIRUB SERPL-MCNC: 0.4 MG/DL (ref 0–1.2)
BILIRUB UR STRIP.AUTO-MCNC: NEGATIVE MG/DL
BUN SERPL-MCNC: 13 MG/DL (ref 6–23)
CALCIUM SERPL-MCNC: 9.2 MG/DL (ref 8.6–10.3)
CHLORIDE SERPL-SCNC: 104 MMOL/L (ref 98–107)
CO2 SERPL-SCNC: 28 MMOL/L (ref 21–32)
COLOR UR: ABNORMAL
CREAT SERPL-MCNC: 1.1 MG/DL (ref 0.5–1.3)
EGFRCR SERPLBLD CKD-EPI 2021: 79 ML/MIN/1.73M*2
EOSINOPHIL # BLD AUTO: 0.14 X10*3/UL (ref 0–0.7)
EOSINOPHIL NFR BLD AUTO: 1.3 %
ERYTHROCYTE [DISTWIDTH] IN BLOOD BY AUTOMATED COUNT: 12.3 % (ref 11.5–14.5)
GLUCOSE SERPL-MCNC: 104 MG/DL (ref 74–99)
GLUCOSE UR STRIP.AUTO-MCNC: NORMAL MG/DL
HCT VFR BLD AUTO: 41.8 % (ref 41–52)
HGB BLD-MCNC: 14.4 G/DL (ref 13.5–17.5)
IMM GRANULOCYTES # BLD AUTO: 0.04 X10*3/UL (ref 0–0.7)
IMM GRANULOCYTES NFR BLD AUTO: 0.4 % (ref 0–0.9)
KETONES UR STRIP.AUTO-MCNC: NEGATIVE MG/DL
LACTATE SERPL-SCNC: 1 MMOL/L (ref 0.4–2)
LEUKOCYTE ESTERASE UR QL STRIP.AUTO: NEGATIVE
LIPASE SERPL-CCNC: 22 U/L (ref 9–82)
LYMPHOCYTES # BLD AUTO: 2.32 X10*3/UL (ref 1.2–4.8)
LYMPHOCYTES NFR BLD AUTO: 22.3 %
MCH RBC QN AUTO: 32.3 PG (ref 26–34)
MCHC RBC AUTO-ENTMCNC: 34.4 G/DL (ref 32–36)
MCV RBC AUTO: 94 FL (ref 80–100)
MONOCYTES # BLD AUTO: 0.87 X10*3/UL (ref 0.1–1)
MONOCYTES NFR BLD AUTO: 8.3 %
MUCOUS THREADS #/AREA URNS AUTO: NORMAL /LPF
NEUTROPHILS # BLD AUTO: 7.02 X10*3/UL (ref 1.2–7.7)
NEUTROPHILS NFR BLD AUTO: 67.4 %
NITRITE UR QL STRIP.AUTO: NEGATIVE
NRBC BLD-RTO: 0 /100 WBCS (ref 0–0)
PH UR STRIP.AUTO: 6 [PH]
PLATELET # BLD AUTO: 221 X10*3/UL (ref 150–450)
POTASSIUM SERPL-SCNC: 3.9 MMOL/L (ref 3.5–5.3)
PROT SERPL-MCNC: 7.3 G/DL (ref 6.4–8.2)
PROT UR STRIP.AUTO-MCNC: ABNORMAL MG/DL
RBC # BLD AUTO: 4.46 X10*6/UL (ref 4.5–5.9)
RBC # UR STRIP.AUTO: NEGATIVE MG/DL
RBC #/AREA URNS AUTO: NORMAL /HPF
SODIUM SERPL-SCNC: 136 MMOL/L (ref 136–145)
SP GR UR STRIP.AUTO: >1.05
SQUAMOUS #/AREA URNS AUTO: NORMAL /HPF
UROBILINOGEN UR STRIP.AUTO-MCNC: NORMAL MG/DL
WBC # BLD AUTO: 10.4 X10*3/UL (ref 4.4–11.3)
WBC #/AREA URNS AUTO: NORMAL /HPF

## 2025-05-28 PROCEDURE — 85025 COMPLETE CBC W/AUTO DIFF WBC: CPT

## 2025-05-28 PROCEDURE — 81003 URINALYSIS AUTO W/O SCOPE: CPT

## 2025-05-28 PROCEDURE — 36415 COLL VENOUS BLD VENIPUNCTURE: CPT

## 2025-05-28 PROCEDURE — 2500000004 HC RX 250 GENERAL PHARMACY W/ HCPCS (ALT 636 FOR OP/ED): Mod: JZ

## 2025-05-28 PROCEDURE — 96375 TX/PRO/DX INJ NEW DRUG ADDON: CPT

## 2025-05-28 PROCEDURE — 96374 THER/PROPH/DIAG INJ IV PUSH: CPT

## 2025-05-28 PROCEDURE — 83605 ASSAY OF LACTIC ACID: CPT

## 2025-05-28 PROCEDURE — 83690 ASSAY OF LIPASE: CPT

## 2025-05-28 PROCEDURE — 80053 COMPREHEN METABOLIC PANEL: CPT

## 2025-05-28 PROCEDURE — 74177 CT ABD & PELVIS W/CONTRAST: CPT | Performed by: RADIOLOGY

## 2025-05-28 PROCEDURE — 2550000001 HC RX 255 CONTRASTS

## 2025-05-28 PROCEDURE — 99285 EMERGENCY DEPT VISIT HI MDM: CPT | Mod: 25

## 2025-05-28 PROCEDURE — 74177 CT ABD & PELVIS W/CONTRAST: CPT

## 2025-05-28 PROCEDURE — 96361 HYDRATE IV INFUSION ADD-ON: CPT

## 2025-05-28 PROCEDURE — 2500000001 HC RX 250 WO HCPCS SELF ADMINISTERED DRUGS (ALT 637 FOR MEDICARE OP)

## 2025-05-28 RX ORDER — AMOXICILLIN AND CLAVULANATE POTASSIUM 875; 125 MG/1; MG/1
1 TABLET, FILM COATED ORAL ONCE
Status: COMPLETED | OUTPATIENT
Start: 2025-05-28 | End: 2025-05-28

## 2025-05-28 RX ORDER — DICYCLOMINE HYDROCHLORIDE 20 MG/1
20 TABLET ORAL 2 TIMES DAILY
Qty: 14 TABLET | Refills: 0 | Status: SHIPPED | OUTPATIENT
Start: 2025-05-28 | End: 2025-06-04

## 2025-05-28 RX ORDER — ONDANSETRON 4 MG/1
4 TABLET, ORALLY DISINTEGRATING ORAL EVERY 8 HOURS PRN
Qty: 30 TABLET | Refills: 0 | Status: SHIPPED | OUTPATIENT
Start: 2025-05-28

## 2025-05-28 RX ORDER — AMOXICILLIN AND CLAVULANATE POTASSIUM 875; 125 MG/1; MG/1
1 TABLET, FILM COATED ORAL EVERY 12 HOURS
Qty: 14 TABLET | Refills: 0 | Status: SHIPPED | OUTPATIENT
Start: 2025-05-28 | End: 2025-06-04

## 2025-05-28 RX ORDER — ONDANSETRON HYDROCHLORIDE 2 MG/ML
4 INJECTION, SOLUTION INTRAVENOUS ONCE
Status: COMPLETED | OUTPATIENT
Start: 2025-05-28 | End: 2025-05-28

## 2025-05-28 RX ORDER — KETOROLAC TROMETHAMINE 15 MG/ML
15 INJECTION, SOLUTION INTRAMUSCULAR; INTRAVENOUS ONCE
Status: COMPLETED | OUTPATIENT
Start: 2025-05-28 | End: 2025-05-28

## 2025-05-28 RX ADMIN — KETOROLAC TROMETHAMINE 15 MG: 15 INJECTION, SOLUTION INTRAMUSCULAR; INTRAVENOUS at 16:36

## 2025-05-28 RX ADMIN — SODIUM CHLORIDE 500 ML: 9 INJECTION, SOLUTION INTRAVENOUS at 16:14

## 2025-05-28 RX ADMIN — IOHEXOL 75 ML: 350 INJECTION, SOLUTION INTRAVENOUS at 16:30

## 2025-05-28 RX ADMIN — ONDANSETRON 4 MG: 2 INJECTION, SOLUTION INTRAMUSCULAR; INTRAVENOUS at 16:36

## 2025-05-28 RX ADMIN — AMOXICILLIN AND CLAVULANATE POTASSIUM 1 TABLET: 875; 125 TABLET, FILM COATED ORAL at 19:27

## 2025-05-28 ASSESSMENT — PAIN - FUNCTIONAL ASSESSMENT
PAIN_FUNCTIONAL_ASSESSMENT: 0-10
PAIN_FUNCTIONAL_ASSESSMENT: 0-10

## 2025-05-28 ASSESSMENT — PAIN SCALES - GENERAL
PAINLEVEL_OUTOF10: 3
PAINLEVEL_OUTOF10: 3

## 2025-05-28 ASSESSMENT — PAIN DESCRIPTION - DESCRIPTORS: DESCRIPTORS: DISCOMFORT

## 2025-05-28 ASSESSMENT — COLUMBIA-SUICIDE SEVERITY RATING SCALE - C-SSRS
2. HAVE YOU ACTUALLY HAD ANY THOUGHTS OF KILLING YOURSELF?: NO
1. IN THE PAST MONTH, HAVE YOU WISHED YOU WERE DEAD OR WISHED YOU COULD GO TO SLEEP AND NOT WAKE UP?: NO
6. HAVE YOU EVER DONE ANYTHING, STARTED TO DO ANYTHING, OR PREPARED TO DO ANYTHING TO END YOUR LIFE?: NO

## 2025-05-28 ASSESSMENT — PAIN DESCRIPTION - PAIN TYPE: TYPE: ACUTE PAIN

## 2025-05-28 ASSESSMENT — PAIN DESCRIPTION - LOCATION: LOCATION: OTHER (COMMENT)

## 2025-05-28 ASSESSMENT — PAIN DESCRIPTION - ORIENTATION: ORIENTATION: LEFT

## 2025-05-28 NOTE — DISCHARGE INSTRUCTIONS
Can complete entire course of antibiotics as prescribed.  Additionally given medicine to help with nausea and cramping.  Continue Tylenol as needed for pain.  Follow-up closely with primary care provider in the following week.  Referral given for gastroenterology to follow-up with.

## 2025-05-28 NOTE — ED TRIAGE NOTES
Flank pain x 3-4 weeks was seen in UC had prescription medication given felt much better, pain return this week. Pain 3/10

## 2025-05-28 NOTE — ED PROVIDER NOTES
HPI   Chief Complaint   Patient presents with    Flank Pain       Patient is a 54-year-old male presents emergency department for evaluation of left flank pain and left lower quadrant abdominal pain.  Patient states that at the beginning of the month he was having some intermittent left flank pain and went to urgent care and was prescribed medications for possible kidney stone and states he was placed on Keflex as well as Flomax.  He states after that his symptoms improved.  He states he had somewhat resolution of his symptoms.  He states about 2 weeks ago he strained his low back as well and thought maybe that his pain was more muscular.  He states over the last week he has had increasing pain to his left flank that radiates into his left lower abdomen worsening over the last 24 hours.  He denies any changes in bowel movements and denies any associated urinary symptoms.  He denies any nausea, vomiting, fevers, chills, headedness, dizziness.  He has not noticed any dysuria or blood in the urine.      History provided by:  Patient   used: No            Patient History   Medical History[1]  Surgical History[2]  Family History[3]  Social History[4]    Physical Exam   ED Triage Vitals [05/28/25 1543]   Temperature Heart Rate Respirations BP   36.7 °C (98 °F) 81 18 136/87      Pulse Ox Temp Source Heart Rate Source Patient Position   100 % Oral Monitor Sitting      BP Location FiO2 (%)     Left arm --       Physical Exam  Constitutional:       Appearance: Normal appearance.   Cardiovascular:      Rate and Rhythm: Normal rate and regular rhythm.   Pulmonary:      Effort: Pulmonary effort is normal.      Breath sounds: Normal breath sounds.   Abdominal:      General: Abdomen is flat.      Palpations: Abdomen is soft.      Tenderness: There is abdominal tenderness. There is no right CVA tenderness or left CVA tenderness.      Comments: Left lower quadrant abdominal pain.   Musculoskeletal:          General: Normal range of motion.   Skin:     General: Skin is warm and dry.   Neurological:      General: No focal deficit present.      Mental Status: He is alert and oriented to person, place, and time.           ED Course & MDM   Diagnoses as of 05/31/25 0856   Diverticulitis of descending colon                 No data recorded                                 Medical Decision Making  Patient is a 54-year-old male presents emergency department for evaluation of left flank pain and left lower quadrant abdominal pain.    Lab work done today included CMP, CBC, lipase, lactic acid, urinalysis.  Lab work without significant abnormality.    Scans done today were interpreted/confirmed by radiologist and also interpreted by me which included CT abdomen/pelvis with contrast.  CT scan shows acute descending colonic diverticulitis with colonic diverticulosis.    Medications given at today's visit include IV fluids, IV Zofran, IV Toradol    I saw this patient independently.  Patient remained stable while in the emergency department.  Lab work without significant abnormality.  CT imaging reveals patient having findings consistent with acute descending colon diverticulitis without evidence for rupture or abscess or acute complicating factors.  No evidence for acute surgical emergency and patient otherwise well-appearing with pain well-controlled.  Patient be started on oral Augmentin for coverage of diverticulitis as well as Zofran and Bentyl to help with nausea and abdominal cramping.  He was educated on the importance of close follow-up with gastroenterology and primary care provider outpatient.  He was agreeable to plan and discharge at this time.    All labs, imaging, and diagnostic studies were reviewed by me and patient was counseled on clinical impression, expectations, and plan.  Patient was educated to follow-up with PCP in the following 1-2 days.  All questions from patient were answered. They elicited understanding  and were agreeable to course of treatment.  Patient was discharged in stable condition and given strict return precautions.    Prescriptions given on discharge: P.o. Augmentin, Zofran, Bentyl    ** Disclaimer:  Parts of this document were written utilizing a voice to text dictation software.  Note may contain minor transcription or typographical errors that were inadvertently transcribed by the computer software.        Procedure  Procedures       [1]   Past Medical History:  Diagnosis Date    Anxiety     Arthritis     DJD (degenerative joint disease)     GERD (gastroesophageal reflux disease)     Other specified health status     No known health problems   [2]   Past Surgical History:  Procedure Laterality Date    EYE SURGERY      OTHER SURGICAL HISTORY  07/23/2021    Eye surgery   [3]   Family History  Problem Relation Name Age of Onset    Atrial fibrillation Father sukhi    [4]   Social History  Tobacco Use    Smoking status: Former     Current packs/day: 1.00     Average packs/day: 1 pack/day for 10.0 years (10.0 ttl pk-yrs)     Types: Cigarettes    Smokeless tobacco: Never   Substance Use Topics    Alcohol use: Not Currently    Drug use: Not Currently     Types: Marijuana        Analy Armenta PA-C  05/31/25 0856

## 2025-05-29 LAB — HOLD SPECIMEN: NORMAL

## 2025-07-12 DIAGNOSIS — G47.00 INSOMNIA, UNSPECIFIED TYPE: ICD-10-CM

## 2025-07-14 RX ORDER — TRAZODONE HYDROCHLORIDE 100 MG/1
TABLET ORAL
Qty: 90 TABLET | Refills: 0 | OUTPATIENT
Start: 2025-07-14

## 2025-09-09 ENCOUNTER — APPOINTMENT (OUTPATIENT)
Dept: PRIMARY CARE | Facility: CLINIC | Age: 55
End: 2025-09-09
Payer: COMMERCIAL